# Patient Record
(demographics unavailable — no encounter records)

---

## 2017-09-15 NOTE — RAD
Indication hernia.



Axial images through the abdomen and pelvis were obtained. Both oral and IV

contrast were administered. 75 cc of Omnipaque 300 was administered

intravenously. No prior CT imaging of the abdomen or pelvis is available.



There is some volume loss at the left lung base likely reflecting atelectasis.

There is probable mild bronchiectasis at the lung bases. Minimal scarring is

seen at the right lung base.



There is a very small periumbilical hernia containing only fat and appearing

uncomplicated. No additional hernia is seen and no inguinal hernia is

apparent. Some calcification is noted associated with the right hemidiaphragm.

The liver and spleen appear unremarkable. The gallbladder appears grossly

normal. No pancreatic abnormality is seen. There are no adrenal masses. There

are low-density masses associated with the left kidney compatible with cysts.

A mass inflammatory process or acute finding in the abdomen is not seen. No

acute finding is apparent in the pelvis. There are degenerative changes

involving the hips. 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 Degenerative changes

are seen in the lumbar spine.



IMPRESSION: No acute finding seen in the abdomen or pelvis.

                          Chronic changes at the lung bases.

                          Tiny periumbilical hernia. No additional hernia is

seen.

                           Left renal cysts.

                           Chronic musculoskeletal changes.











[employer] Compliance Statement:



One or more of the following individualized dose reduction techniques were

utilized for this examination:

1. Automated exposure control

2. Adjustment of the mA and/or kV according to patient size

3. Use of iterative reconstruction technique